# Patient Record
(demographics unavailable — no encounter records)

---

## 2024-10-09 NOTE — HISTORY OF PRESENT ILLNESS
[FreeTextEntry1] : HISTORY:  67 y/o female w/ Hx of MCTD/Scleroderma/Sjogren's (Raynaud's, GERD, telangiectasia, sclerodactyly, sicca symptoms, BARB+, SSA+, Centromere+) dx at age 42, NICM, papillary thyroid carcinoma s/p thyroidectomy with hospital admission for cardiogenic shock in 4/2021 and hyponatremia and possible infection in 6/2021 presents as follow up to rheumatology for management of her autoimmune rheumatic disease. Patient was seen by Dr. Ambrose in 3/2021. Patient underwent cardiac transplant on 11/2021 (with associated complicated hospitalization from 11/2021 - 1/2022 with AMS, CMV colitis, possible SIBO), now on Prograf.   Patient's treatment of underlying autoimmune disease (in my opinion predominantly scleroderma without skin thickening at this time) would depend on signs of active inflammation, which would likely manifest as synovitis, ILD, kidney disease which she does not have evidence of currently. Pt's NICM currently has unclear relationship with her underlying disease. MRI heart did not show any infiltrative disease or active myocarditis in 3/2021. HRCT in 3/2021 did not show signs of ILD, and PFTs done in 3/2021 we could not obtain records of. However, patient does not have major pulm symptoms after treatment of her HF. At this time, I would like to hold off on any immunosuppressant treatment unless there is any further evidence of active inflammation. Pt is now on Prograf for cardiac transplant, which may have some beneficial effect on pt's underlying autoimmune disease.   Pt's b/l hand swelling is not clearly inflammatory. However, given pt's scleroderma, I would watch carefully for signs of skin thickening, which can start with inflammation of skin, salt-and-pepper rash, and pruritus prior to skin thickening. However, I would expect skin thickening early in course of disease, not >20 years after initial diagnosis.   INTERVAL HISTORY:  Pt was last seen by me in 10/2022, late follow up due to being overwhelmed with numerous doctor visits. Patient was started on Imuran after annual cardiac cath atherosclerotic CAD suggesting mild rejection. Patient was started on Effexor for depression. Patient was started on gabapentin by neuro for LLE neuropathy symptoms. Patient follows with vascular for venous insufficiency of legs.  Pt continues to have Raynaud's, new onset of feet but generally stable because she does not usually leave the house. Pt has not had any recent lung evaluation. Pt has severe GERD on pantoprazole 40mg/day, but has not followed with GI recently.  WORKUP:  DXA (12/2023): Osteoporosis. Lowest T-score -2.5 femoral neck HRCT (3/2021): No ILD, but small to moderate pleural effusions  left lower extremity/nonweight-bearing

## 2024-10-09 NOTE — ASSESSMENT
[FreeTextEntry1] : 69 y/o female w/ Hx of MCTD/Scleroderma/Sjogren's (Raynaud's, GERD, telangiectasia, sclerodactyly, sicca symptoms, BARB+, SSA+, Centromere+) dx at age 42, NICM, papillary thyroid carcinoma s/p thyroidectomy with hospital admission for cardiogenic shock in 4/2021 and hyponatremia and possible infection in 6/2021 presents as follow up to rheumatology for management of her autoimmune rheumatic disease. Patient was seen by Dr. Ambrose in 3/2021. Patient underwent cardiac transplant on 11/2021 (with associated complicated hospitalization from 11/2021 - 1/2022 with AMS, CMV colitis, possible SIBO), now on Prograf.   Patient's treatment of underlying autoimmune disease (in my opinion predominantly scleroderma without skin thickening at this time) would depend on signs of active inflammation, which would likely manifest as synovitis, ILD, kidney disease which she does not have evidence of currently. Pt's NICM currently has unclear relationship with her underlying disease. MRI heart did not show any infiltrative disease or active myocarditis in 3/2021. HRCT in 3/2021 did not show signs of ILD, and PFTs done in 3/2021 we could not obtain records of. However, patient does not have major pulm symptoms after treatment of her HF. At this time, I would like to hold off on any immunosuppressant treatment unless there is any further evidence of active inflammation. Pt is now on Prograf for cardiac transplant, which may have some beneficial effect on pt's underlying autoimmune disease.  Patient was started on Imuran after annual cardiac cath atherosclerotic CAD suggesting mild rejection.  Patient was started on Effexor for depression. Patient was started on gabapentin by neuro for LLE neuropathy symptoms. Patient follows with vascular for venous insufficiency of legs. Pt has not had any recent lung evaluation. Pt has severe GERD on pantoprazole 40mg/day, but has not followed with GI recently.  Given pt's long standing SSc, we are mainly watching for long term effects of SSc including pHTN, ILD, GERD. Pt's cardiac status is being closely monitored by cardiology. I will try to make sure we are not missing any new onset of ILD.  Patient has borderline osteoporosis. Given pt's many other medication problems and medications, she would like to hold off on medications for osteoporosis at this time and consider after repeat DXA result in future.  - Repeat HRCT (last in 3/2021 negative for ILD) to follow up any lung involvement of scleroderma.  - Treatment of s/p cardiac transplant as per cardiology. Patient is on AZA as part of heart transplant medication which can help with the autoimmune process. - Advised follow up with GI and continue PPI for severe GERD likely secondary to scleroderma. Advised on GERD precautions, including avoiding meals prior to sleeping, sleeping with HOB >30 degrees elevated.  - Raynaud's: Stable with no new digital ulcers. Pt is currently on amlodipine for HTN. If duration and frequency of Raynaud's increases and there are frequent digital tip ulcers, would consider additional treatment. Raynaud's precautions discussed with patient Sicca: Improved, does not affect daily life  - Would avoid secretagogues if able to function with sicca symptoms with conservative management as it is only symptomatic relief and has many side effects.  - Last DXA 12/2023 with borderline osteoporosis but defers treatment. Repeat DXA in 12/2025. - Will contact pt with HRCT Results. RTC in 6 months for follow up

## 2024-10-09 NOTE — PHYSICAL EXAM
[TextEntry] : GENERAL: Appears in no acute distress HEENT: EOMI, PERRLA. No conjunctival erythema. Moist mucous membranes. No nasopharyngeal ulcers NECK: Supple, no cervical lymphadenopathy, no thyromegaly CARDIOVASCULAR: RRR PULMONARY: Clear to auscultation b/l, no wheezes, rales, or crackles ABDOMINAL: Soft, nontender, nondistended. Bowel sounds present. No organomegaly. MSK: Surgical scar, midline of chest. No active synovitis, swelling, erythema, or warmth. Raynaud's changes of b/l hands. SKIN: Occasional telangiectasia of b/l hands. Livido reticularis of b/l arms and legs. No skin thickening. No digital tip ulcers. NEURO: No focal deficits PSYCH: AAOx3. Normal affect and thought process.

## 2024-11-04 NOTE — REASON FOR VISIT
[Other: ____] : [unfilled] [FreeTextEntry1] : PCP: Dr. Mery Pettit Rheumatologist: Dr. Pavan Corbett GI: Dr. Roya Bonner

## 2024-11-04 NOTE — ASSESSMENT
[FreeTextEntry1] : 68 yo F (originally from [place]) with stage D NICM s/p HVAD as BTT on 4/27/21 (was listed status 4) now s/p OHT 11/18/21, cellept induced diarrhea/colitis, Sjogren'`s/Scleroderma since age 42 (Raynaud syndrome, GERD, telangiectasia, sclerodactyly, +Ro/centromere and BARB), ex-smoker, papillary thyroid carcinoma (T1a N0 Mx 0.8cm) s/p total thyroidectomy '19 and proteinuria who comes for routine follow up.

## 2024-11-04 NOTE — DISCUSSION/SUMMARY
[Patient] : the patient [Risks] : risks [FreeTextEntry1] : # OHT 11/18/21 - Ischemic time 194 mins - CDC crossmatch B cell positive - 1 session plasmapheresis/IvIg 11/18/21 - Repeat DSAs 1/24/23 = 0%  # Immunosuppression - Tacro 1mg BID. Tacrolimus goal to 6-8.  Repeat labs pending draw for this week. - Continue Imuran 50mg daily - Prednisone discontinued and avoiding Cellcept due to pancolitis  # Rejection Surveillance - Now starting year 4 post txp, noninvasive surveillance typically can be spread out to q6 months.   However, with her limited compliance and follow up will continue on q3 month surveillance with Allomap/Allosure (home draw), and TTEs, and q3 months labs (At [facility])  - unable to enroll in [facility] due to insurance issues.  However, patient does want to start using the gym on her own.  # CAV screening -Patient defers stress cardiac MRI at [facility].  Will try to arrange dobutamine stress echo at .  # Prophylaxis - CMV +/+ intermediate risk: was on Valcyte 450mg BID x 6 months (completed on 5/18/22) - Toxo -/- low risk.  - PJP prophylaxis: Bactrim switched to Mepron due to leukopenia. Completed 1 year (11/18/22) - Strongyloides positive, s/p Ivermectin - CAV: on crestor 20mg daily and ASA.  -Lipid panel 11/27/2023: , HDL 57, LDL 56, . -Lipid panel 2/29/2024: , HDL 54, LDL 79, . - will need repeat lipid panel with next labs.  # Lower extremity swelling L >R, lymphedema -She is having difficulties taking her Lasix every day.  We have tried maintenance therapy.  She is currently taking her Lasix 10 mg as needed. more on a weekly basis.  - Saw vascular team Dr. Comer.  Imaging performed in June 2024.  No DVT however, has deep venous insufficiency of left leg, and GSV insufficiency of right leg consistent with lymphedema.  Was fitted for UNNA boot.  Has not yet started using this.  #Intermittent palpitations -7-day Holter monitor completed in May 2024.  No arrhythmias.  # Diarrhea with hx of pancolitis/diarrhea with pancreatic duct dilation - H/o hematochezia on 12/31/22. Initial flex sigmoidoscopy biopsy was negative for CMV. Repeated sigmoidoscopy/biopsy was positive for CMV. However, finalized official report say no evidence of CMV. - Completed trial of rifaximin for possible SIBO - Readmitted with diarrhea, GI infectious work up negative. CT continues to show ?colitis. - Did not tolerated Imodium while inpatient - worsening bloating  - now following with Dr. Roya Bonner.  s/p EGD and colonoscopy.  Will have transplant team facilitate obtaining records from these procedures as well as follow-up appointment for patient - diarrhea was controlled by now back for 3 weeks.   - will check stool studies and CMV level.   # Right sided lacunar infarct with left leg numbness - Postop c/b left sided facial droop, slurred speech and left sided weakness. Symptoms improved. - Unclear if artifact on head CT.  Follow up brain MRI in 5/9/23: Cerebral volume loss with small vessel ischemic changes no acute infarct. -Has followed up with neurology.  Being referred to Dr. Paris. Repeat brain MRI shows moderate vascular changes and stable aneurysm.  EEG performed.  - ok to use gabapentin at night for neuropathy.  # Depression/Anxiety - Will have TXP team help facilitate appointment with PCP who may be able to refer to local psych team.  Was previously on Effexor however, I no longer see this on her med list.  # SJogren/Scleroderma - Follows with Dr. Corbett (Rheum).  Needs high-resolution CT for ILD monitoring.  # Hypothyroidism - Continue levothyroxine.  Managed by her PCP.   Needs endo appointment.  Can offer her a local option here in .  Patient defers for now.  #Health maintenance - needs F/U with PCP.  Will have TXP team help facilitate appointment. - Last DEXA 12/5/23: Spine: -0.9, normal, Femoral neck: -2.5, osteoporosis, Total hip: -2.2, osteopenia. I requested she follow up with her PCP for this. - Mammo/PAP: overdue.  Last mammo in 2021. - Derm: overdue for annual skin screen.  Will have TXP team help faciltiate appointment.  # Social -  has contacted pt and son and has provided arrangements for transportation services to assist her to appointments.  Her son was not present at the visit today.  She has an easier time getting to appointments in the [place] area.  Will try to make an effort to keep things local for her.  Follow-up with me in 3 months.

## 2024-11-04 NOTE — PHYSICAL EXAM
[Normal] : soft, non-tender, no masses/organomegaly, normal bowel sounds [No Rash] : no rash [Moves all extremities] : moves all extremities [Alert and Oriented] : alert and oriented [de-identified] : JVP at clavicle [de-identified] : Well-healed sternotomy scar [de-identified] : ambulates with cane [de-identified] : 1+ edema today around left ankle and foot dorsum [de-identified] : some skin tightening and shine consistent with her scleroderma diagnosis.

## 2024-11-04 NOTE — CARDIOLOGY SUMMARY
[de-identified] : 8/5/24: NSR, nonspecific IVCD with some RBBB features. 5/1/2024: NSR, nonspecific IVCD with some RBBB features. 2/7/2024: NSR, HR 89, RSR' V1 1/24/23: SR HR 95bpm with RBBB 5/31/22: ST , incomplete RBBB, LAE, QR V1/V2 [de-identified] : Holter monitor 5/1 - 5/9/2024: NSR, HR 62-1 25, average 88, RBBB pattern, 1 run of SVT max 4 beats.  PVCs and PACs less than 1% burden. [de-identified] : 8/5/2024: LVEF 75%, LVEDD 3.4 cm, IVSd 1.1 cm, GLS -16.4%, RV mildly enlarged, TAPSE decreased 1.3 cm, mod TR, PASP 30mmHg. 4/22/2024: LVEF 65 to 70%, GLS -13.1%, LVEDD 3.2 cm, IVSd 1.1 cm, normal diastolic function, normal RV, mild MR, PASP 29 mmHg. 12/20/23: LVEF 66%, GLS -14.2% (improved from prior on 10/20/2023 of -13.5%), LVEDD 3.2 cm, IVSd 1.0 cm, G3DD, mild MR, mod TR. 12/9/22: normal biV function. No valvular abnormalities. No pericardial effusion. 8/30/22: Normal bi-v function. Mild-mod TR. No evidence of pericardial effusion. No significant changes compared to prior TTE 7/8/22. 7/8/22: TTE: normal bi-v function 6/1/22 TTE: normal bi-v function 3/29/22 limited TTE. Normal RV function. No PE.  2/15/22 normal graft function, mild-mod TR. No PE.  1/18/22 normal graft function. Mod TR. No PE.   [de-identified] : 5/18/22 CT abdomen: mild left colonic wall thickening, similar to prior studies, raising concern for colitis. Diffuse main pancreatic ductal dilatation to 6 mm, increased from 12/5/2021.  [de-identified] : DEXA 12/5/23: Spine: -0.9, normal, Femoral neck: -2.5, osteoporosis, Total hip: -2.2 , osteopenia. [de-identified] : 11/13/23 LHC: coronary circulation is right dominant, mild diffuse disease LAD, LM, and RCA.  No CAD on IVUS. 1/24/23 RHC/ EMBx: 0R, C4d neg. 12/9/22: L/RHC: mild diffuse CAD, RA 2 PAP 17/9/12 PCW 5 Pasat 66% Annelise CO/CI 4.5/2.8. EMBx ISHLT Grade 1R/2 3/29/22 RHC RA 11 RV 25/4  PA 26/12/17 PCWP 9 /99 Annelise CO/CI 4.7/3.07  ACR 0R/pAMR 0 2/15/22 RA 8 RV 31/9 PA 31/8/21 PCWP 9 PA sat 69.4%Annelise CO/CI 4.8/3.17 ACR 0R pAMR 0 1/18/22: RA 2, RV 25/0, PCWP 5, PA 23/9/15,Ao Sat 99.0, PA sat 67.3, CI 3.4, ACR 1R/1A pAMR 0 1/4/22: RA 2, PA 26/10/16, SVR 1122. CI 3.56. ACR 1R/1A, pAMR 0 12/28/21: RA 5, RV 27/6, PA 27/11/18, PCWP 9  V Wave 12, PA sat 64.5 /5/85, CO/CI 3.8/3.0 ACR 1R/2 pAMR 0 12/14/21: RA 10/13/8, RV 31/1/11, PCWP 11, PA sat 76.9 ACR 0R pAMR 0 12/7/21: RA 9, RV 31/9, PA 34/7 20, PCWP 13 (v 19), Pa 66, Annelise 5.2/2.8 ACR 0R pAMR 0 11/30/21: RA 9, PA 33/16 (24), PCWP 16, Annelise CO/CI 6.0/3.7, TD CO/CI 3.5/2.2. ACR 0R/pAMR 0 11/24/21: RA 6, RV 29/9, PA 25/8/15, PCWP 9, CO/CI 4.5/2.8 (on mil 0.125); ISHLT grade 0; AMR 0 [de-identified] : Allomap/allosure 4/23/24 32/0.08 3/23/24 32/ 0.09 1/30/24 34/ <0.08 2/15/22 18/0.16 3/9/22 19/0.20 3/29/22 <0.12 4/6/22 28 4/19 12/<0.12 5/26/22 33/<0.12 6/23 35/<012 7/29 36/<0.12 8/31 34/<0.12 9/30 36/<0.12 11/04/22 34/<0.12 12/16/22 31/<0.12  DSA 11/24/21 No DSAs 11/30/21 No DSAs 12/7/21 No DSAs 12/14/21 No DSAs 12/28/21 No DSAs 1/04/22 No DSAs 1/18/22 No DSAs 2/15/22 No DSAs 3/29/22 No DSAs 10/17/22 DR1 1644  1/24/23 Class I/II 0/0

## 2024-11-04 NOTE — HISTORY OF PRESENT ILLNESS
[FreeTextEntry1] : 70 yo F (originally from [place]) with stage D NICM s/p HVAD as BTT on 4/27/21 (was listed status 4) now s/p OHT 11/18/21, cellept induced diarrhea/colitis, Sjogren'`s/Scleroderma since age 42 (Raynaud syndrome, GERD, telangiectasia, sclerodactyly, +Ro/centromere and BARB), ex-smoker, papillary thyroid carcinoma (T1a N0 Mx 0.8cm) s/p total thyroidectomy '19 and proteinuria who comes for routine follow up.   She was admitted for planned heart transplant after a suitable donor was identified. Patient underwent OHT on 11/18/21, ischemic time ~3hours. Intraop she was given 3uPRBC, 1uPLT, 1uCryo. Postop course was complicated due to persistent elevated lactate. Her cardiac index was borderline and donor CDC crossmatch report subsequently came back positive for donor B cell crossmatch. Given her borderline HD and positive crossmatch she was given 1 session of plasmapheresis and IVIG on 11/18. Her hemodynamics improved but developed slurred speech, left facial droop, and LUE weakness 11/22 prompting head CT which showed small acute lacunar infarct and EEG was negative. She also had persistent sinus bradycardia and was on terbutaline for a period of time and developed LVOT obstruction from hyperdynamic function which eventually  resolved. She had persistent altered mental status and there was a concern for PRES prompting transition from tacrolimus to cyclosporine and due to worsening mental status required intubation on 11/26. She had a persistent leukocytosis and CT abdomen raised concern for pneumatosis with air within SMV prompting laparotomy which revealed patchy hemorrhagic areas in the sigmoid/transverse colon without necrosis. She underwent sigmoidoscopy on 1/3/22 which showed colitis. 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 Frequency of BMs also improved following introduction of Rifaximin on 1/16 for possible SIBO.   She had a prolonged hospitalization from 11/18/21 through 1/19/22. Her mental status improved and prograf was reintroduced. Her most active issue was profound deconditioning for which she was transferred to [place] rehab. She was discharged from rehab on 2/3/22.   She was admitted to [facility] from 5/18/22 to 5/20/22 after she c/o nausea, vomiting and intractable diarrhea. Her labs were remarkable for leukopenia. She had extensive infectious work up including abdomen CT, Stool GI/PCR panel, CMV PCR, blood cultures and urine culture. She was found to have a UTI for which she was discharged on cefpodoxime x 5 more days. Her abd CT showed similar findings as prior studies raising concern for colitis. She was also found to have pancreatic duct dilatation. GI recommended MRI/MRCP. Her symptoms improved and she was discharged with recommendations to follow up with TxID and GI. She had completed valcyte for CMV prophylaxis. Bactrim was switched to Mepron due to leukopenia.   10/2022 she was admitted for active shingles on her left back/underarm and received IV antiviral therapy w/ improvement.   At patient's first annual LHC/RHC demonstrated mild diffuse atherosclerotic CAD and EMB ISHLT Grade 1R/2 and Imuran was started. Since then, she developed elevated liver enzymes including alk phos.  There was some pancreatic duct dilation that was identified but never got the recommended MRI follow-up.  Post transplant she struggled with intermittent medication compliance as well as consultant follow-up.  Some of this has been in the setting of likely depression.  She has reported feeling lost and having minimal social interactions staying home most of the day.  She was seen by Dr. Kong on 2/22/23 and started on Effexor with some benefit in her mood and energy.  However, she still struggles with going to all of her  appointments despite aid with transportation.  2nd annual LHC/RHC with persistent mild diffuse CAD (11/13/23) with EMB 0R, C4d neg (1/24/23).  Echocardiogram and Allomap/sure have been stable.   She was referred to cardiac rehab multiple times however was noted that her insurance is not taken at [facility] rehab center.    She has been stable with q3 monthly echo and heartcare checks.    On 5/14/24 she had noted new intermittent palpitations.  Zio patch was placed.  No change in medications were made.  She was seen by neurology with MRI revealing a stable brain aneurysm and negative EEG ( rare bitemporal intermittent slowing - nonspecific - no seizures).  I last saw her on 8/9/2024 with stable echocardiogram.  At that visit, she noted she had a recent GI workup.  She notes that she was started on an antibiotic, likely rifaximin, that was quite costly for her.  Taking this.  I also had advised her to take Lasix daily however she continues to do this as needed.  She was also seen again by neurology and started on gabapentin however she did not take as she thought this was a narcotic.  She did not have an echo scheduled for this visit.  She also declined doing a stress MRI as she no longer wants to travel to [place].  Today she comments that her heart is perfect and she is having no cardiac symptoms.  She does occasionally have a 2 to 3 lb weight fluctuation at home.  Weight usually runs 158 to 161 lbs.  On these days she often forgets to take her diuretic.  She has stable lower extremity edema.  She has not needed to take this. She specifically denies angina, SOB at rest, dizziness/LH, orthopnea and PND.  She reports new diarrhea today that has been happening for the past 3 weeks.  She did not call her office to let us know.  She says this happens almost daily.

## 2024-11-29 NOTE — ASSESSMENT
[FreeTextEntry1] : 70 yo F (originally from [place]) with stage D NICM s/p HVAD as BTT on 4/27/21 (was listed status 4) now s/p OHT 11/18/21, cellept induced diarrhea/colitis, Sjogren'`s/Scleroderma since age 42 (Raynaud syndrome, GERD, telangiectasia, sclerodactyly, +Ro/centromere and BARB), ex-smoker, papillary thyroid carcinoma (T1a N0 Mx 0.8cm) s/p total thyroidectomy '19 and proteinuria who comes for routine follow up.

## 2024-11-29 NOTE — DISCUSSION/SUMMARY
[Patient] : the patient [Risks] : risks [FreeTextEntry1] : # OHT 11/18/21 - Ischemic time 194 mins - CDC crossmatch B cell positive - 1 session plasmapheresis/IvIg 11/18/21 - Repeat DSAs 1/24/23 = 0%  # Immunosuppression - Tacro 1mg BID. Tacrolimus goal to 6-8.  Repeat labs pending draw for this week.  Las level was 5.6 on 11/5/24.  - Continue Imuran 50mg daily - Prednisone discontinued and avoiding Cellcept due to pancolitis  # Rejection Surveillance - Now starting year 4 post txp, noninvasive surveillance typically can be spread out to q6 months.   However, with her limited compliance and follow up will continue on q3 month surveillance with Allomap/Allosure (home draw), and TTEs, and q3 months labs (At [facility])  - unable to enroll in [facility] due to insurance issues.  However, patient does want to start using the gym on her own.  # CAV screening -Patient defers stress cardiac MRI at [facility].  Dobutamine stress echo at  done on 11/26/24 with no ischemia noted.  # Prophylaxis - CMV +/+ intermediate risk: was on Valcyte 450mg BID x 6 months (completed on 5/18/22) - Toxo -/- low risk.  - PJP prophylaxis: Bactrim switched to Mepron due to leukopenia. Completed 1 year (11/18/22) - Strongyloides positive, s/p Ivermectin - CAV: on crestor 20mg daily and ASA.  -Lipid panel 11/27/2023: , HDL 57, LDL 56, . -Lipid panel 2/29/2024: , HDL 54, LDL 79, . -Lipid panel 11/5/2024: , HDL 59, LDL 74, .  # Lower extremity swelling L >R, lymphedema -She is having difficulties taking her Lasix every day.  We have tried maintenance therapy.  She is currently taking her Lasix 10 mg as needed. more on a weekly basis.  - Saw vascular team Dr. Comer.  Imaging performed in June 2024.  No DVT however, has deep venous insufficiency of left leg, and GSV insufficiency of right leg consistent with lymphedema.  Was fitted for UNNA boot.  Has not yet started using this.  #Intermittent palpitations -7-day Holter monitor completed in May 2024.  No arrhythmias.  # Diarrhea with hx of colitis - H/o hematochezia on 12/31/22. Initial flex sigmoidoscopy biopsy was negative for CMV. Repeated sigmoidoscopy/biopsy was positive for CMV. However, finalized official report say no evidence of CMV. - Completed trial of rifaximin for possible SIBO - Readmitted with diarrhea, GI infectious work up negative. CT continues to show ?colitis. - Did not tolerated Imodium while inpatient - worsening bloating - Last colonoscopy 3/8/2024: Chronic inflammation on gastric biopsy, nonerosive gastritis, small hiatal hernia.  There was a 2 mm and 6 mm rectosigmoid polyp with internal/external hemorrhoids.  Sigmoid colon biopsy shows active colitis with ulceration with no definitive polyp identified. - Follows with [facility] - Dr. Roya Bonner and Dr. Rios Ya.   - diarrhea was controlled by now back since mid-October 2024. -I reached out to GI team with regards to her new symptoms of diarrhea.  They are planning repeat colonoscopy with CMV screening in early December.  Patient was unable to afford a repeat trial of Rifaximin.  # Chronic pancreatic duct dilation -MRI/MRCP 9/9/2024 with mild pancreatic duct dilation up to 4 mm in diameter grossly unchanged since 2022.  Small cysts in the pancreas measuring up to 1 cm.  # Right sided lacunar infarct with left leg numbness - Postop c/b left sided facial droop, slurred speech and left sided weakness. Symptoms improved. - Unclear if artifact on head CT.  Follow up brain MRI in 5/9/23: Cerebral volume loss with small vessel ischemic changes no acute infarct. -Has followed up with neurology.  Being referred to Dr. Paris. Repeat brain MRI shows moderate vascular changes and stable aneurysm.  EEG performed.  - ok to use gabapentin at night for neuropathy from a cardiac standpoint, however patient does not want to use this. - Has follow up appointment in Jan.  # Depression/Anxiety - previously on Effexor however, I no longer see this on her med list. -Needs to establish care with new PCP to refer her to local psych team.  # Sjogren/Scleroderma - Follows with Dr. Corbett ([place]).  Needs high-resolution CT for ILD monitoring.  # Hypothyroidism - Continue levothyroxine.  Managed by her PCP.   Needs endo appointment.  Can offer her a local option here in .  Patient defers for now.  #Health maintenance - needs F/U with PCP.  She notes her PCP has retired and she has lost contact with them.  Have office help reach out to establish a new primary doctor.  She is overdue for all of her health maintenance screening. - Last DEXA 12/5/23: Spine: -0.9, normal, Femoral neck: -2.5, osteoporosis, Total hip: -2.2, osteopenia. - Mammo/PAP: overdue.  Last mammo in 2021. - Derm: overdue for annual skin screen.  #Preoperative assessment -Plan for colonoscopy. - At present, there are no active cardiac conditions.  She has good graft function with recent dobutamine stress echo showing no evidence of ischemia/infarction. - Baseline functional status is acceptable. - The clinical benefit of the proposed procedure outweighs the associated cardiovascular risk. - Risk not attenuated with further CV testing. - Prior testing as outlined above. - Optimized from a cardiovascular perspective.  Follow-up with me in Jan with routine echo.

## 2024-11-29 NOTE — CARDIOLOGY SUMMARY
[de-identified] : 8/5/24: NSR, nonspecific IVCD with some RBBB features. 5/1/2024: NSR, nonspecific IVCD with some RBBB features. 2/7/2024: NSR, HR 89, RSR' V1 1/24/23: SR HR 95bpm with RBBB 5/31/22: ST , incomplete RBBB, LAE, QR V1/V2 [de-identified] : Holter monitor 5/1 - 5/9/2024: NSR, HR 62-1 25, average 88, RBBB pattern, 1 run of SVT max 4 beats.  PVCs and PACs less than 1% burden. [de-identified] : Dobumtamine stress echo 11/26/2024: Negative for ischemia infarction.  Hyperdynamic LV function with increased LVOT gradients with dobutamine and chordal RADHA noted. [de-identified] : 8/5/2024: LVEF 75%, LVEDD 3.4 cm, IVSd 1.1 cm, GLS -16.4%, RV mildly enlarged, TAPSE decreased 1.3 cm, mod TR, PASP 30mmHg. 4/22/2024: LVEF 65 to 70%, GLS -13.1%, LVEDD 3.2 cm, IVSd 1.1 cm, normal diastolic function, normal RV, mild MR, PASP 29 mmHg. 12/20/23: LVEF 66%, GLS -14.2% (improved from prior on 10/20/2023 of -13.5%), LVEDD 3.2 cm, IVSd 1.0 cm, G3DD, mild MR, mod TR. 12/9/22: normal biV function. No valvular abnormalities. No pericardial effusion. 8/30/22: Normal bi-v function. Mild-mod TR. No evidence of pericardial effusion. No significant changes compared to prior TTE 7/8/22. 7/8/22: TTE: normal bi-v function 6/1/22 TTE: normal bi-v function 3/29/22 limited TTE. Normal RV function. No PE.  2/15/22 normal graft function, mild-mod TR. No PE.  1/18/22 normal graft function. Mod TR. No PE.   [de-identified] : 5/18/22 CT abdomen: mild left colonic wall thickening, similar to prior studies, raising concern for colitis. Diffuse main pancreatic ductal dilatation to 6 mm, increased from 12/5/2021.  [de-identified] : DEXA 12/5/23: Spine: -0.9, normal, Femoral neck: -2.5, osteoporosis, Total hip: -2.2 , osteopenia. [de-identified] : 11/13/23 LHC: coronary circulation is right dominant, mild diffuse disease LAD, LM, and RCA.  No CAD on IVUS. 1/24/23 RHC/ EMBx: 0R, C4d neg. 12/9/22: L/RHC: mild diffuse CAD, RA 2 PAP 17/9/12 PCW 5 Pasat 66% Annelise CO/CI 4.5/2.8. EMBx ISHLT Grade 1R/2 3/29/22 RHC RA 11 RV 25/4  PA 26/12/17 PCWP 9 /99 Annelise CO/CI 4.7/3.07  ACR 0R/pAMR 0 2/15/22 RA 8 RV 31/9 PA 31/8/21 PCWP 9 PA sat 69.4%Annelise CO/CI 4.8/3.17 ACR 0R pAMR 0 1/18/22: RA 2, RV 25/0, PCWP 5, PA 23/9/15,Ao Sat 99.0, PA sat 67.3, CI 3.4, ACR 1R/1A pAMR 0 1/4/22: RA 2, PA 26/10/16, SVR 1122. CI 3.56. ACR 1R/1A, pAMR 0 12/28/21: RA 5, RV 27/6, PA 27/11/18, PCWP 9  V Wave 12, PA sat 64.5 /5/85, CO/CI 3.8/3.0 ACR 1R/2 pAMR 0 12/14/21: RA 10/13/8, RV 31/1/11, PCWP 11, PA sat 76.9 ACR 0R pAMR 0 12/7/21: RA 9, RV 31/9, PA 34/7 20, PCWP 13 (v 19), Pa 66, Annelise 5.2/2.8 ACR 0R pAMR 0 11/30/21: RA 9, PA 33/16 (24), PCWP 16, Annelise CO/CI 6.0/3.7, TD CO/CI 3.5/2.2. ACR 0R/pAMR 0 11/24/21: RA 6, RV 29/9, PA 25/8/15, PCWP 9, CO/CI 4.5/2.8 (on mil 0.125); ISHLT grade 0; AMR 0 [de-identified] : Allomap/allosure 4/23/24 32/0.08 3/23/24 32/ 0.09 1/30/24 34/ <0.08 2/15/22 18/0.16 3/9/22 19/0.20 3/29/22 <0.12 4/6/22 28 4/19 12/<0.12 5/26/22 33/<0.12 6/23 35/<012 7/29 36/<0.12 8/31 34/<0.12 9/30 36/<0.12 11/04/22 34/<0.12 12/16/22 31/<0.12  DSA 11/24/21 No DSAs 11/30/21 No DSAs 12/7/21 No DSAs 12/14/21 No DSAs 12/28/21 No DSAs 1/04/22 No DSAs 1/18/22 No DSAs 2/15/22 No DSAs 3/29/22 No DSAs 10/17/22 DR1 1644  1/24/23 Class I/II 0/0

## 2024-11-29 NOTE — PHYSICAL EXAM
[Normal] : soft, non-tender, no masses/organomegaly, normal bowel sounds [No Rash] : no rash [Moves all extremities] : moves all extremities [Alert and Oriented] : alert and oriented [de-identified] : JVP at clavicle [de-identified] : Well-healed sternotomy scar [de-identified] : ambulates with cane [de-identified] : 1+ edema today around left ankle and foot dorsum [de-identified] : some skin tightening and shine consistent with her scleroderma diagnosis.

## 2024-11-29 NOTE — REASON FOR VISIT
MEDICARE WELLNESS VISIT - SUBSEQUENT      Patient: Dave Roman Date of Service: 2022   : 1952 MRN: [id number]     SUBJECTIVE:     Chief Complaint   Patient presents with   â¢ Medicare Wellness Visit   â¢ Imm/Inj     Dtap  Shingles  Covid #4       HISTORY OF PRESENT ILLNESS:  Dave Roman is a 79year old male who presents today for his Annual Medicare Wellness Visit. Still working full time at [employer]. Thinking will go to [facility] next year for a few months. Has 2 grown boys in the area. Wife still working as a teacher. History of right knee pain  Says symptoms are now better since he has been walking a lot for work and exercising. Â   History of MI in 2015  seeing Dr. Julian Lennon. Did not tolerate atorvastatin and rosuvastatin, soÂ he is onÂ livalo 2mg daily BID. Also getting repatha injection every 2wks. Also on aspirin 81mg daily. Â   Still onÂ carvedilolol 3.125mg 2x/day  Has 2 small bruises on his right and left wrist.  Â   Pulmonary Fibrosis, COPD  Quit smoking in . He smoked from 2021 until Dec 2021. (says he got upset after a memorial for his brother), due to this bought some cigarettes. Since then no longer smoking cigarettes. However, admits to smoking weed 2x/day  on Symbicort 160-4.5mgÂ BID  He was given prescription for spiriva but says he didn't tolerate it, says it made him uncomfortable. Was given script for trelegey ellipta but says he felt a burning in his chest  also used Ventolin as needed,Â using aboutÂ 3-4x/day  seen by pulm in . had spirometry. also had CT scan for lung nodules that were scar tissue. Mood Disorder  Says he is smoking weed and it helps his mood. Not interested in medications. Says he wants to work on quitting weed. Â   Thyroid Nodule  had US showing nodule that was small and likely benign.   they were seen by cathy,FARTUN Coulter (in [place]), then they saw Dr. Kathy Angulo he had a repeat US which was stable, but that was a while ago  Says he had been recommended biopsy and doesn't want to do that. Had been given an order for US thyroid, but hasn't done. Â   Vit D def  Not taking vit D supplement. Â   Health Maintenance:  Colon:Â colonoscopy 1/23/19 with Dr. Carolina Olivarez, repeat in 5 years due to polyps  Prostate: no urinary symptoms, had PSA 7/16/21  AAA screen: 3/23/22: negative    --    Patient Answered Medicare HRA Screening Questions  1.) Do you have an Advance directive, living will, or power of  for health care document that contains your wishes for end of life care? No    2.) Would you like additional information on advance directives? Yes    3.) During the past 4 weeks, how would you rate your health? Good    4.) During the past 4 weeks, what was the hardest physical activity you could do for at least 2 minutes? Moderate    5.) Do you do moderate to strenuous exercise (brisk walk) for about 20 minutes for 3 or more days per week? No, but I am active with housework or yard work (vacuuming, raking, etc.)    6.) How many servings of the following would you typically eat in a day? Fruits and Vegetables (1 serving = 1 piece of fruit, 1/2 cup fruits or vegetables) 2-3 per day  High Fiber / Whole Grain Foods (1 serving = 1 cup cold cereal, 1/2 cup cooked cereal, 1 slice bread) 2-3 per day  Fried or [employer] (1 serving = 1 Minnette Ingris, [place] Mallie Tracee, chips, doughnut, fried chicken/fish) 1 per day  Sugar Sweetened Beverages (1 serving = 1 can or 12 oz cup of soda or juice) None    7.) Have you had a fall two or more times in the past year? No Value exists for the : AMB#599    8.) During the past 4 weeks, has your physical and emotional health limited your social activities with family, friends, neighbors, or other groups? Not at all    9.) Do you feel safe at home? Yes    10.) How often do you have trouble taking medicines the way you have been told to take them?  I always take my prescribed medications    11.) Over the past 4 weeks how often have you experienced the following? Bladder Control problems - (urine leaking)Never  Bowel control problems - Never  Teeth or Denture Problems - Never  Bodily pain - Never  Tiredness or Fatigue - Never  Feeling stressed or overwhelmed - Never  Anger or frustration - Never  Problems with your hearing - Never  Problems using the telephone - Never  Problems with your balance - No Value exists for the : AMB#627  Driven/Ridden in a car without wearing your seatbelt - Never  Sexual Problems - Never    12.) Do you need help with any of the following activities (bathing, grooming, feeding, toileting, getting out of bed/chairs)? None of these apply to me    13.) Do you need help with any of the following activities (going to places outside of walking distance, shopping, housework/laundry, preparing meals, handling own money)? None of these apply to me    14.) During the past 4 weeks, was someone available to help if you needed and wanted help? Yes, as much as I wanted    15.) How confident are you that you can control and manage most of your health problems? Somewhat confident      Cognitive Assessment: no evidence of cognitive dysfunction by direct observation    Body mass index is 25.47 kg/mÂ². BMI ASSESSMENT/PLAN:  Patient is overweight.     Journal food intake daily and 30-60 minutes of physical activity a day        Screenings  ADLs  ADL Before Admission: Independent  ADL Needs Assist: No  ADL Score: 12    Short of Breath or Fatigue with ADL's: No  Recent Decline in ADL's: No    Are you deaf or do you have serious difficulty  hearing? : No  Are you blind or do you have serious difficulty seeing, even when wearing glasses?: No  Sensory Support Devices: Eyeglasses, Dentures    iADLs       Home Safety: lives with wife, safe at home    Depression PHQ2/9:  Little interest or pleasure in activity?: Not at all  Feeling down, depressed or hopeless?: Not at all  Initial depression screening score[de-identified] 0  PHQ2 Interpretation: No further screening needed         Depression assessment/plan: Depression screening is negative no further plan needed. Cognitive/Functional Status:  Are you deaf or do you have serious difficulty  hearing? : No  Are you blind or do you have serious difficulty seeing, even when wearing glasses?: No  Are you blind or do you have serious difficulty seeing, even when wearing glasses?: No  Because of a physical, mental, or emotional condition, do you have serious difficulty concentrating, remembering or making decisions? : No  Do you have serious difficulty walking or climbing stairs?: No  Do you have difficulty dressing or bathing?: No  Because of a physical, mental, or emotional condition, do you have difficulty doing errands alone?: No  Patient was given repeat back words from version[de-identified] 4 - 100 [facility]  Patient able to fill in the clock face with 10 minutes past 11 o'clock?: No, clock is not correct (took two times)  Cognitive Assessment: no evidence of cognitive dysfunction by direct observation    STEADI-Fall Risk       Hearing Impairment: Are you deaf or do you have serious difficulty  hearing? : No  Vision/Hearing Screening:    Hearing Screening    125Hz 250Hz 500Hz 1000Hz 2000Hz 3000Hz 4000Hz 6000Hz 8000Hz   Right ear:            Left ear:            Comments: Able to hear finger rub bilaterally     Visual Acuity Screening    Right eye Left eye Both eyes   Without correction:      With correction: 20/25 20/25 20/20        Advanced care planning: Discussed with patient. Patient understand need and will complete forms. Forms provided    CARE TEAM:  Patient Care Team:  Jeanette Rhodes DO as PCP - General  Gisel Webb MD as Cardiologist (Cardiovascular Disease)    REVIEW OF SYSTEMS:  All systems reviewed and negative except noted in HPI.     MEDICATIONS:  Current Outpatient Medications   Medication Sig   â¢ ibuprofen (MOTRIN) 800 MG tablet    â¢ albuterol 108 (90 Base) MCG/ACT inhaler Inhale 2 puffs into the lungs every 4 hours as needed for Shortness of Breath or Wheezing. â¢ budesonide-formoterol (Symbicort) 160-4.5 MCG/ACT inhaler INHALE 2 PUFFS BY MOUTH TWICE DAILY. RINSE MOUTH AFTER USE   â¢ carvedilol (COREG) 3.125 MG tablet Take 1 tablet by mouth in the morning and 1 tablet in the evening. Take with meals. â¢ Repatha 140 MG/ML injection    â¢ Livalo 2 MG Tab TAKE 1 TABLET BY MOUTH DAILY   â¢ aspirin (ECOTRIN) 81 MG EC tablet Take 81 mg by mouth. No current facility-administered medications for this visit. Librada Krabbe   Current Outpatient Medications   Medication Sig Dispense Refill   â¢ ibuprofen (MOTRIN) 800 MG tablet      â¢ albuterol 108 (90 Base) MCG/ACT inhaler Inhale 2 puffs into the lungs every 4 hours as needed for Shortness of Breath or Wheezing. 25.5 g 1   â¢ budesonide-formoterol (Symbicort) 160-4.5 MCG/ACT inhaler INHALE 2 PUFFS BY MOUTH TWICE DAILY. RINSE MOUTH AFTER USE 30.6 g 3   â¢ carvedilol (COREG) 3.125 MG tablet Take 1 tablet by mouth in the morning and 1 tablet in the evening. Take with meals. 180 tablet 3   â¢ Repatha 140 MG/ML injection      â¢ Livalo 2 MG Tab TAKE 1 TABLET BY MOUTH DAILY 30 tablet 0   â¢ aspirin (ECOTRIN) 81 MG EC tablet Take 81 mg by mouth. No current facility-administered medications for this visit. [address] [place] [address] [id number] - [address] [facility] AT [address] [place] [address] Drive [address]  Phone: [phone] Fax: [phone]      Patient Care Team:  Abdirashid Proctor DO as PCP - Sabine Riojas MD as Cardiologist (Cardiovascular Disease)    ALLERGIES:  ALLERGIES:  No Known Allergies    PROBLEM LIST:    Patient Active Problem List   Diagnosis   â¢ Arteriosclerosis of coronary artery   â¢ COPD (chronic obstructive pulmonary disease) (CMS/[facility])   â¢ Fear of flying   â¢ Hyperlipidemia   â¢ Need for influenza vaccination   â¢ Need for vaccination   â¢ Prostate cancer screening   â¢ Screen for colon cancer   â¢ Thyroid nodule   â¢ Lung nodule   â¢ History of myocardial infarction in adulthood   â¢ History of Lily-Andrews syndrome   â¢ Prediabetes   â¢ Coronary artery disease involving native coronary artery of native heart   â¢ Vitamin D deficiency   â¢ Health maintenance examination   â¢ Long term (current) use of aspirin    â¢ Interstitial pulmonary fibrosis (CMS/HCC)       PAST MEDICAL HISTORY:  Past Medical History:   Diagnosis Date   â¢ Bilateral inguinal hernia 02/10/2012   â¢ COVID-19 virus infection 2022   â¢ Lily-Andrews tear 2015    Dr. Bradley Chávez   â¢ Numbness and tingling of both feet 2020       PAST SURGICAL HISTORY:  Past Surgical History:   Procedure Laterality Date   â¢ Coronary stent placement  2015    anterior MI with PCI stent placement to mid LAD   â¢ Esophagogastroduodenoscopy  2015    showing active bleeding from [facility] Tear   â¢ Hernia repair Bilateral     inginual hernia repair       FAMILY HISTORY:  Family History   Problem Relation Age of Onset   â¢ Heart disease Mother    â¢ Heart disease Father    â¢ COPD Father    â¢ Patient is unaware of any medical problems Sister    â¢ Alcohol Abuse Brother    â¢ Cancer, Kidney Brother    â¢ Myocardial Infarction Brother          at age 59   â¢ Patient is unaware of any medical problems Brother        SOCIAL HISTORY:  Social History     Tobacco Use   â¢ Smoking status: Former Smoker     Packs/day: 2.50     Years: 50.00     Pack years: 125.00     Types: Cigarettes     Quit date: 2015     Years since quittin.7   â¢ Smokeless tobacco: Never Used   â¢ Tobacco comment: 2-3packs for about 50 years   Vaping Use   â¢ Vaping Use: never used   Substance Use Topics   â¢ Alcohol use: Yes     Comment: social   â¢ Drug use: Yes     Frequency: 7.0 times per week     Types: Marijuana     Comment: everyday       Patient's medications, allergies, past medical, surgical, social and family histories were reviewed and updated as "appropriate. OBJECTIVE:     Visit Vitals  /78 (BP Location: LUE - Left upper extremity, Patient Position: Sitting, Cuff Size: Regular)   Pulse 63   Temp 98.2 Â°F (36.8 Â°C) (Oral)   Ht 5' 11"" (1.803 m)   Wt 82.9 kg (182 lb 10.4 oz)   SpO2 96%   BMI 25.47 kg/mÂ²     CONSTITUTIONAL:  healthy-appearing, well nourished, NAD, ambulating normally  HEAD: normocephalic, atraumatic  EYES:  No discharge or pallor. Non-injected. EOMI. Sclera non-icteric. ENMT: Normal dentition. No mouth or lip ulcers. MMM. external canals clear, TMs clear, no lesions on external ear. NECK: supple. FROM. No masses. LUNGS: no dyspnea. No wheezing, rales or ronchi. Breath sounds normal with good air movement. CARDIO: RRR, normal S1/S2, no murmurs, rubs, or gallops. ABDOMEN: bowel sounds normal.   Soft, non-tender to palpation. No guarding or rebound tenderness. No masses. No hepatomegaly. No spleenomegaly. MUSCULOSKELETAL: strength and tone normal, no bony abnormalities, normal movement of all extremities. No malalignment or bony abnormalities. No edema. Full ROM. BACK: Back symmetric, normal curvature. SKIN: Skin color, texture, turgor normal. No rashes or suspicious lesions. No abnormal ecchymosis. No jaundice. Normal nails. NEUROLOGIC: Gait normal. Muscle strength 5/5 throughout. Sensation grossly intact. DIAGNOSTIC STUDIES:     LAB RESULTS:  Reviewed/ordered    ASSESSMENT AND PLAN:   This is a 79year old year-old male who presents with:  1. Health maintenance examination    2. Interstitial pulmonary fibrosis (CMS/HCC)    3. Arteriosclerosis of coronary artery    4. Chronic bronchitis, unspecified chronic bronchitis type (CMS/HCC)    5. Mixed hyperlipidemia    6. Prostate cancer screening    7. History of myocardial infarction in adulthood    8. Vitamin D deficiency    9. Coronary artery disease involving native coronary artery of native heart without angina pectoris    10. Prediabetes    11.  " Thyroid nodule    12. Long term (current) use of aspirin       Orders Placed This Encounter   â¢ US THYROID   â¢ CBC with Automated Differential   â¢ Comprehensive Metabolic Panel   â¢ Glycohemoglobin   â¢ Lipid Panel With Reflex   â¢ Vitamin D -25 Hydroxy   â¢ Thyroid Stimulating Hormone Reflex   â¢ PSA   â¢ Urinalysis & Reflex Microscopy With Culture If Indicated   â¢ Microalbumin Urine Random   â¢ DISCONTD: amoxicillin (AMOXIL) 500 MG capsule   â¢ DISCONTD: clindamycin (CLEOCIN) 300 MG capsule   â¢ ibuprofen (MOTRIN) 800 MG tablet   â¢ albuterol 108 (90 Base) MCG/ACT inhaler   â¢ budesonide-formoterol (Symbicort) 160-4.5 MCG/ACT inhaler   â¢ carvedilol (COREG) 3.125 MG tablet       CAD s/p anterior MI with PCI stent placement to mid LAD on 11/16/15  -continue f/u with Dr. Apoilnar Dumont  -ordered labs as above  -continue medsÂ per cardiology (carvedilol, livalo, rapatha, and aspirin)  Â   Pulmonary Fibrosis, COPD  -continue symbicort 2 puffs BID  -continue ventalin inhaler as needed for SOB  -discussed adding additional controller medications, but pt declines. If worsening SOB, recommend pulm referral  Â   Thyroid Nodules  -order for US thyroid as previously orderedÂ for further evaluation  Â   Vit D def  -ordered Vit D levels  -further dose adjustments pending results of labs  Â   History of Knee Pain  -consider ortho if symptoms  Â   Health Maintenance:  -discussed healthy diet and exercise  (encouraged www. [email or web address]. govÂ for further info regarding healthy diet and encouraged 10,000steps/day or 150min exercise/week)  -ordered labs as above  -vaccines:Â recommend shingles x2 and Tdap vaccines. pt declines today but agrees to do at next OV.   -colonoscopy: UTD 1/19 with Dr. Huseyin Mcgregor, repeat in 5 years due to polyps  -discussed prostate cancer screening: ordered PSA  -lung cancer screening for smokers; CT lung cancer screening UTD  -AAA screen:Â 3/23/22: negative.  -advanced directives: completed with patient  Â   Â   Testing should be completed prior to next visit. New prescriptions / refills sent to the pharmacy. Patient was advised to call if they experience any new or worsening symptoms. Screening schedule/checklist for next 5-10 years:  Health Maintenance Due   Topic Date Due   â¢ DTaP/Tdap/Td Vaccine (1 - Tdap) Never done   â¢ Shingles Vaccine (1 of 2) Never done   â¢ COVID-19 Vaccine (4 - Booster for [place] series) 12/23/2021   â¢ Traditional Medicare- Medicare Wellness Visit  07/16/2022        Needed Screening/Treatment:   Cardiovascular screening - Lipids , Cardiovascular screening - Abdominal aortic US, Diabetes screening  and Colorectal cancer screening      Needed follow up:  None    FOLLOW UP: No follow-ups on file. Instructions provided as documented in the AVS.  The patient indicated understanding of the diagnosis and agreed with the plan of care. A written education, counseling, referral, and plan for obtaining appropriate screening services has been given to patient. See patient instructions. [Other: ____] : [unfilled] [FreeTextEntry1] : PCP: Dr. Mery Pettit Rheumatologist: Dr. Pavan Corbett GI: Dr. Roya Bonner

## 2024-11-29 NOTE — HISTORY OF PRESENT ILLNESS
[FreeTextEntry1] : 70 yo F (originally from [place]) with stage D NICM s/p HVAD as BTT on 4/27/21 (was listed status 4) now s/p OHT 11/18/21, cellept induced diarrhea/colitis, Sjogren'`s/Scleroderma since age 42 (Raynaud syndrome, GERD, telangiectasia, sclerodactyly, +Ro/centromere and BARB), ex-smoker, papillary thyroid carcinoma (T1a N0 Mx 0.8cm) s/p total thyroidectomy '19 and proteinuria who comes for routine follow up.   She was admitted for planned heart transplant after a suitable donor was identified. Patient underwent OHT on 11/18/21, ischemic time ~3hours. Intraop she was given 3uPRBC, 1uPLT, 1uCryo. Postop course was complicated due to persistent elevated lactate. Her cardiac index was borderline and donor CDC crossmatch report subsequently came back positive for donor B cell crossmatch. Given her borderline HD and positive crossmatch she was given 1 session of plasmapheresis and IVIG on 11/18. Her hemodynamics improved but developed slurred speech, left facial droop, and LUE weakness 11/22 prompting head CT which showed small acute lacunar infarct and EEG was negative. She also had persistent sinus bradycardia and was on terbutaline for a period of time and developed LVOT obstruction from hyperdynamic function which eventually  resolved. She had persistent altered mental status and there was a concern for PRES prompting transition from tacrolimus to cyclosporine and due to worsening mental status required intubation on 11/26. She had a persistent leukocytosis and CT abdomen raised concern for pneumatosis with air within SMV prompting laparotomy which revealed patchy hemorrhagic areas in the sigmoid/transverse colon without necrosis. She underwent sigmoidoscopy on 1/3/22 which showed colitis. 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 Frequency of BMs also improved following introduction of Rifaximin on 1/16 for possible SIBO.   She had a prolonged hospitalization from 11/18/21 through 1/19/22. Her mental status improved and prograf was reintroduced. Her most active issue was profound deconditioning for which she was transferred to [facility] rehab. She was discharged from rehab on 2/3/22.   She was admitted to [facility] from 5/18/22 to 5/20/22 after she c/o nausea, vomiting and intractable diarrhea. Her labs were remarkable for leukopenia. She had extensive infectious work up including abdomen CT, Stool GI/PCR panel, CMV PCR, blood cultures and urine culture. She was found to have a UTI for which she was discharged on cefpodoxime x 5 more days. Her abd CT showed similar findings as prior studies raising concern for colitis. She was also found to have pancreatic duct dilatation. GI recommended MRI/MRCP. Her symptoms improved and she was discharged with recommendations to follow up with TxID and GI. She had completed valcyte for CMV prophylaxis. Bactrim was switched to Mepron due to leukopenia.   10/2022 she was admitted for active shingles on her left back/underarm and received IV antiviral therapy w/ improvement.   At patient's first annual LHC/RHC demonstrated mild diffuse atherosclerotic CAD and EMB ISHLT Grade 1R/2 and Imuran was started. Since then, she developed elevated liver enzymes including alk phos.  There was some pancreatic duct dilation that was identified but never got the recommended MRI follow-up.  Post transplant she struggled with intermittent medication compliance as well as consultant follow-up.  Some of this has been in the setting of likely depression.  She has reported feeling lost and having minimal social interactions staying home most of the day.  She was seen by Dr. Kong on 2/22/23 and started on Effexor with some benefit in her mood and energy.  However, she still struggles with going to all of her  appointments despite aid with transportation.  2nd annual LHC/RHC with persistent mild diffuse CAD (11/13/23) with EMB 0R, C4d neg (1/24/23).  Echocardiogram and Allomap/sure have been stable.   She was referred to cardiac rehab multiple times however was noted that her insurance is not taken at [facility] rehab center.    She has been stable with q3 monthly echo and heartcare checks.    On 5/14/24 she had noted new intermittent palpitations.  Zio patch was placed.  No change in medications were made.  She was seen by neurology with MRI revealing a stable brain aneurysm and negative EEG (rare bitemporal intermittent slowing - nonspecific - no seizures).  I last saw her on 8/9/2024 with stable echocardiogram.  At that visit, she noted she had a recent GI workup.  She notes that she was started on an antibiotic, likely rifaximin, that was quite costly for her.  She is not taking this.  I also had advised her to take Lasix daily however she continues to do this as needed.  She was also seen again by neurology and started on gabapentin however she did not take as she thought this was a narcotic.  I last saw her on 11/4/2024.  Patient reported ongoing diarrhea x 3 weeks which she did not tell the transplant team about.  I had reached out to her GI team at [facility] who had plans for repeating stool studies checks possibly repeating colonoscopy for CMV screening.  In addition, patient deferred going to [place] for stress MRI.  Dobutamine stress echocardiogram was arranged for CAV surveillance.  She is here today to review those results.  Today she overall feels well.  She still has intermittent diarrhea mixed with constipation.  She notes almost nightly pain in her abdomen that starts around 6 PM.  She took Lasix 1 time this week for chronic left lower extremity edema.  She has not needed to take this. She specifically denies angina, SOB at rest, dizziness/LH, orthopnea and PND.  She notes she has a colonoscopy scheduled for early December.  At baseline, she ambulates with a cane.

## 2025-01-30 NOTE — HISTORY OF PRESENT ILLNESS
[FreeTextEntry1] : 68 yo F (originally from [place]) with stage D NICM s/p HVAD as BTT on 4/27/21 (was listed status 4) now s/p OHT 11/18/21, cellept induced diarrhea/colitis, Sjogren'`s/Scleroderma since age 42 (Raynaud syndrome, GERD, telangiectasia, sclerodactyly, +Ro/centromere and BARB), ex-smoker, papillary thyroid carcinoma (T1a N0 Mx 0.8cm) s/p total thyroidectomy '19 and proteinuria who comes for routine follow up.   She was admitted for planned heart transplant after a suitable donor was identified. Patient underwent OHT on 11/18/21, ischemic time ~3hours. Intraop she was given 3uPRBC, 1uPLT, 1uCryo. Postop course was complicated due to persistent elevated lactate. Her cardiac index was borderline and donor CDC crossmatch report subsequently came back positive for donor B cell crossmatch. Given her borderline HD and positive crossmatch she was given 1 session of plasmapheresis and IVIG on 11/18. Her hemodynamics improved but developed slurred speech, left facial droop, and LUE weakness 11/22 prompting head CT which showed small acute lacunar infarct and EEG was negative. She also had persistent sinus bradycardia and was on terbutaline for a period of time and developed LVOT obstruction from hyperdynamic function which eventually  resolved. She had persistent altered mental status and there was a concern for PRES prompting transition from tacrolimus to cyclosporine and due to worsening mental status required intubation on 11/26. She had a persistent leukocytosis and CT abdomen raised concern for pneumatosis with air within SMV prompting laparotomy which revealed patchy hemorrhagic areas in the sigmoid/transverse colon without necrosis. She underwent sigmoidoscopy on 1/3/22 which showed colitis. 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 Frequency of BMs also improved following introduction of Rifaximin on 1/16 for possible SIBO.   She had a prolonged hospitalization from 11/18/21 through 1/19/22. Her mental status improved and prograf was reintroduced. Her most active issue was profound deconditioning for which she was transferred to [facility]. She was discharged from rehab on 2/3/22.   She was admitted to [facility] from 5/18/22 to 5/20/22 after she c/o nausea, vomiting and intractable diarrhea. Her labs were remarkable for leukopenia. She had extensive infectious work up including abdomen CT, Stool GI/PCR panel, CMV PCR, blood cultures and urine culture. She was found to have a UTI for which she was discharged on cefpodoxime x 5 more days. Her abd CT showed similar findings as prior studies raising concern for colitis. She was also found to have pancreatic duct dilatation. GI recommended MRI/MRCP. Her symptoms improved and she was discharged with recommendations to follow up with TxID and GI. She had completed valcyte for CMV prophylaxis. Bactrim was switched to Mepron due to leukopenia.   10/2022 she was admitted for active shingles on her left back/underarm and received IV antiviral therapy w/ improvement.   At patient's first annual LHC/RHC demonstrated mild diffuse atherosclerotic CAD and EMB ISHLT Grade 1R/2 and Imuran was started. Since then, she developed elevated liver enzymes including alk phos.  There was some pancreatic duct dilation that was identified but never got the recommended MRI follow-up.  Post transplant she struggled with intermittent medication compliance as well as consultant follow-up.  Some of this has been in the setting of likely depression.  She has reported feeling lost and having minimal social interactions staying home most of the day.  She was seen by Dr. Kong on 2/22/23 and started on Effexor with some benefit in her mood and energy.  However, she still struggles with going to all of her  appointments despite aid with transportation.  2nd annual LHC/RHC with persistent mild diffuse CAD (11/13/23) with EMB 0R, C4d neg (1/24/23).  Echocardiogram and Allomap/sure have been stable.   She was referred to cardiac rehab multiple times however was noted that her insurance is not taken at [facility].    She has been stable with q3 monthly echo and heartcare checks.    On 5/14/24 she had noted new intermittent palpitations.  Zio patch was placed.  No change in medications were made.  She was seen by neurology with MRI revealing a stable brain aneurysm and negative EEG (rare bitemporal intermittent slowing - nonspecific - no seizures).  I last saw her on 8/9/2024 with stable echocardiogram.  At that visit, she noted she had a recent GI workup.  She notes that she was started on an antibiotic, likely rifaximin, that was quite costly for her.  She is not taking this.  I also had advised her to take Lasix daily however she continues to do this as needed.  She was also seen again by neurology and started on gabapentin however she did not take as she thought this was a narcotic.  I last saw her on 11/29/2024.  At that time, patient reported ongoing diarrhea x 3 weeks which she did not tell the transplant team about.  I had reached out to [place] [facility] [place] had repeat her EDG/colo (results not available).  CMV level was unremarkable.  In addition, patient deferred going to [place] for stress MRI.  Dobutamine stress echocardiogram was arranged for CAV surveillance with no suggestion of ischemia or infarction.   Today she overall feels well.  She still has intermittent diarrhea mixed with constipation.  she notes that all of her medical problems now are "non cardiac".  She has upcoming appointments with neurology and GI teams.  She has not needed to take lasix for her conic left lower extremity edema.  She specifically denies angina, SOB at rest, dizziness/LH, orthopnea and PND.  TTE surveillance screening done today.    At baseline, she ambulates with a cane.

## 2025-01-30 NOTE — CARDIOLOGY SUMMARY
[de-identified] : 1/29/15: NSr, HR 82, low voltage precordial leads, nonspecific IVCD with some RBBB features.  8/5/24: NSR, nonspecific IVCD with some RBBB features. 5/1/2024: NSR, nonspecific IVCD with some RBBB features. 2/7/2024: NSR, HR 89, RSR' V1 1/24/23: SR HR 95bpm with RBBB 5/31/22: ST , incomplete RBBB, LAE, QR V1/V2 [de-identified] : Holter monitor 5/1 - 5/9/2024: NSR, HR 62-1 25, average 88, RBBB pattern, 1 run of SVT max 4 beats.  PVCs and PACs less than 1% burden. [de-identified] : Dobumtamine stress echo 11/26/2024: Negative for ischemia infarction.  Hyperdynamic LV function with increased LVOT gradients with dobutamine and chordal RADHA noted. [de-identified] : 1/29/25: LVEF > 75%, LVEDD 3.0cm, IVSd 1.1cm, GLS unable to do, normal RV, TAPSE 1.9cm, trace MR, mod TR, PAPS 28mmHg.  8/5/2024: LVEF 75%, LVEDD 3.4 cm, IVSd 1.1 cm, GLS -16.4%, RV mildly enlarged, TAPSE decreased 1.3 cm, mod TR, PASP 30mmHg. 4/22/2024: LVEF 65 to 70%, GLS -13.1%, LVEDD 3.2 cm, IVSd 1.1 cm, normal diastolic function, normal RV, mild MR, PASP 29 mmHg. 12/20/23: LVEF 66%, GLS -14.2% (improved from prior on 10/20/2023 of -13.5%), LVEDD 3.2 cm, IVSd 1.0 cm, G3DD, mild MR, mod TR. 12/9/22: normal biV function. No valvular abnormalities. No pericardial effusion. 8/30/22: Normal bi-v function. Mild-mod TR. No evidence of pericardial effusion. No significant changes compared to prior TTE 7/8/22. 7/8/22: TTE: normal bi-v function 6/1/22 TTE: normal bi-v function 3/29/22 limited TTE. Normal RV function. No PE.  2/15/22 normal graft function, mild-mod TR. No PE.  1/18/22 normal graft function. Mod TR. No PE.   [de-identified] : 5/18/22 CT abdomen: mild left colonic wall thickening, similar to prior studies, raising concern for colitis. Diffuse main pancreatic ductal dilatation to 6 mm, increased from 12/5/2021.  [de-identified] : DEXA 12/5/23: Spine: -0.9, normal, Femoral neck: -2.5, osteoporosis, Total hip: -2.2 , osteopenia. [de-identified] : 11/13/23 LHC: coronary circulation is right dominant, mild diffuse disease LAD, LM, and RCA.  No CAD on IVUS. 1/24/23 RHC/ EMBx: 0R, C4d neg. 12/9/22: L/RHC: mild diffuse CAD, RA 2 PAP 17/9/12 PCW 5 Pasat 66% Annelise CO/CI 4.5/2.8. EMBx ISHLT Grade 1R/2 3/29/22 RHC RA 11 RV 25/4  PA 26/12/17 PCWP 9 /99 Annelise CO/CI 4.7/3.07  ACR 0R/pAMR 0 2/15/22 RA 8 RV 31/9 PA 31/8/21 PCWP 9 PA sat 69.4%Annelise CO/CI 4.8/3.17 ACR 0R pAMR 0 1/18/22: RA 2, RV 25/0, PCWP 5, PA 23/9/15,Ao Sat 99.0, PA sat 67.3, CI 3.4, ACR 1R/1A pAMR 0 1/4/22: RA 2, PA 26/10/16, SVR 1122. CI 3.56. ACR 1R/1A, pAMR 0 12/28/21: RA 5, RV 27/6, PA 27/11/18, PCWP 9  V Wave 12, PA sat 64.5 /5/85, CO/CI 3.8/3.0 ACR 1R/2 pAMR 0 12/14/21: RA 10/13/8, RV 31/1/11, PCWP 11, PA sat 76.9 ACR 0R pAMR 0 12/7/21: RA 9, RV 31/9, PA 34/7 20, PCWP 13 (v 19), Pa 66, Annelise 5.2/2.8 ACR 0R pAMR 0 11/30/21: RA 9, PA 33/16 (24), PCWP 16, Annelise CO/CI 6.0/3.7, TD CO/CI 3.5/2.2. ACR 0R/pAMR 0 11/24/21: RA 6, RV 29/9, PA 25/8/15, PCWP 9, CO/CI 4.5/2.8 (on mil 0.125); ISHLT grade 0; AMR 0 [de-identified] : Allomap/allosure 8/7/24: 33/0.08 4/23/24 32/0.08 3/23/24 32/ 0.09 1/30/24 34/ <0.08 2/15/22 18/0.16 3/9/22 19/0.20 3/29/22 <0.12 4/6/22 28 4/19 12/<0.12 5/26/22 33/<0.12 6/23 35/<012 7/29 36/<0.12 8/31 34/<0.12 9/30 36/<0.12 11/04/22 34/<0.12 12/16/22 31/<0.12  DSA 11/24/21 No DSAs 11/30/21 No DSAs 12/7/21 No DSAs 12/14/21 No DSAs 12/28/21 No DSAs 1/04/22 No DSAs 1/18/22 No DSAs 2/15/22 No DSAs 3/29/22 No DSAs 10/17/22 DR1 1644  1/24/23 Class I/II 0/0

## 2025-01-30 NOTE — PHYSICAL EXAM
[Normal] : soft, non-tender, no masses/organomegaly, normal bowel sounds [No Rash] : no rash [Moves all extremities] : moves all extremities [Alert and Oriented] : alert and oriented [de-identified] : JVP at clavicle [de-identified] : Well-healed sternotomy scar [de-identified] : ambulates with cane [de-identified] : trace nonpitting edema around left ankle and foot dorsum [de-identified] : some skin tightening and shine consistent with her scleroderma diagnosis.

## 2025-01-30 NOTE — DISCUSSION/SUMMARY
[Patient] : the patient [Risks] : risks [FreeTextEntry1] : # OHT 11/18/21 - Ischemic time 194 mins - CDC crossmatch B cell positive - 1 session plasmapheresis/IvIg 11/18/21 - Repeat DSAs 1/24/23 = 0% - OVERDUE for DSA.  Needs this annually.  Patient does not want to travel outside of [place] to get this done.  Encouraged annual draw.  Logistics work on getting to have this drawn at the [place] lab.   # Immunosuppression - Tacro 1mg BID. Tacrolimus goal to 6-8.  Level from 1/27/15 was 6.8.  - Continue Imuran 50mg daily - Prednisone discontinued and avoiding Cellcept due to pancolitis  # Rejection Surveillance - Now starting year 4 post txp, noninvasive surveillance typically can be spread out to q6 months.   However, with her limited compliance and follow up with q3 months labs + office visit.  - Echo + Allomap/Allosure (home draw) q 6 months. - unable to enroll in [facility] cardiac rehab due to insurance issues.  She is interested in exploring this again.   Will submit referral.    # CAV screening -Patient defers stress cardiac MRI at [facility].  Dobutamine stress echo at  done on 11/26/24 with no ischemia noted.  # Prophylaxis - CMV +/+ intermediate risk: was on Valcyte 450mg BID x 6 months (completed on 5/18/22) - Toxo -/- low risk.  - PJP prophylaxis: Bactrim switched to Mepron due to leukopenia. Completed 1 year (11/18/22) - Strongyloides positive, s/p Ivermectin - CAV: on crestor 20mg daily and ASA.  -Lipid panel 11/27/2023: , HDL 57, LDL 56, . -Lipid panel 2/29/2024: , HDL 54, LDL 79, . -Lipid panel 11/5/2024: , HDL 59, LDL 74, .  # Lower extremity swelling L >R, lymphedema -She is having difficulties taking her Lasix every day.  We have tried maintenance therapy.  She is currently taking her Lasix 10 mg as needed. more on a weekly basis.  - Saw vascular team Dr. Comer.  Imaging performed in June 2024.  No DVT however, has deep venous insufficiency of left leg, and GSV insufficiency of right leg consistent with lymphedema.  Was fitted for UNNA boot.  Has not yet started using this.  #Intermittent palpitations -7-day Holter monitor completed in May 2024.  No arrhythmias.  # Diarrhea with hx of colitis - H/o hematochezia on 12/31/22. Initial flex sigmoidoscopy biopsy was negative for CMV. Repeated sigmoidoscopy/biopsy was positive for CMV. However, finalized official report say no evidence of CMV. - Completed trial of rifaximin for possible SIBO - Readmitted with diarrhea, GI infectious work up negative. CT continues to show ?colitis. - Did not tolerated Imodium while inpatient - worsening bloating - Last colonoscopy 3/8/2024: Chronic inflammation on gastric biopsy, nonerosive gastritis, small hiatal hernia.  There was a 2 mm and 6 mm rectosigmoid polyp with internal/external hemorrhoids.  Sigmoid colon biopsy shows active colitis with ulceration with no definitive polyp identified. - Follows with [facility] GI group - Dr. Rios Ya.   - diarrhea was controlled by now back since mid-October 2024. -I reached out to GI team with regards to her new symptoms of diarrhea.  She had EGD/colo in early December.  Will obtain records.  CMV 1/27/25 - negative.  - Patient was unable to afford a repeat trial of Rifaximin.  # Chronic pancreatic duct dilation -MRI/MRCP 9/9/2024 with mild pancreatic duct dilation up to 4 mm in diameter grossly unchanged since 2022.  Small cysts in the pancreas measuring up to 1 cm. - follow with GI.  Will get records as above.   # Right sided lacunar infarct with left leg numbness - Postop c/b left sided facial droop, slurred speech and left sided weakness. Symptoms improved. - Unclear if artifact on head CT.  Follow up brain MRI in 5/9/23: Cerebral volume loss with small vessel ischemic changes no acute infarct. -Has followed up with neurology.  Being referred to Dr. Paris.  Has visit on 2/12/15. Repeat brain MRI shows moderate vascular changes and stable aneurysm.  EEG performed.   # Depression/Anxiety - previously on Effexor however, I no longer see this on her med list. -Needs to establish care with new PCP to refer her to local psych team.  # Sjogren/Scleroderma - Follows with Dr. Corbett ([facility]).  Needs high-resolution CT for ILD monitoring.  # Hypothyroidism - Continue levothyroxine.  Was managed by her PCP.   Needs endo appointment.  Can offer her a local option here in .  Patient defers for now.  #Health maintenance - OVERDUE for PCP visit and ALL health maintenance screening.   - Patient does not want to have additional testing at this time.  We have tried multiple times to have her plugged in with a new PCP.  Will send referral again. - Last DEXA 12/5/23: Spine: -0.9, normal, Femoral neck: -2.5, osteoporosis, Total hip: -2.2, osteopenia. - Mammo/PAP: overdue.  Last mammo in 2021. - Derm: overdue for annual skin screen.  Follow-up with me in April. Working on establishing communication with patient's son for appointment reminders.  I am concerned that patient may have some memory impairment.  [EKG obtained to assist in diagnosis and management of assessed problem(s)] : EKG obtained to assist in diagnosis and management of assessed problem(s)

## 2025-02-12 NOTE — HISTORY OF PRESENT ILLNESS
[FreeTextEntry1] : Patient is a 69 year old right handed female with extensive PMHx of heart transplant (2021), HTN, HTN, scleroderma, sjogrens who presents for initial evaluation for BL cavernous carotid artery aneurysms and prior history of stroke.   Patient endorses left sided leg numbness that began 6 months before heart transplant while she was on ECMO (suspected injury from compartment syndrome). She denies left face and arm numbness at time when leg numbness started. She reports decent rehabilitation post ECMO, but after heart transplant.   She states the numbness and leg function has gotten worse since her heart transplant.  She does endorse slight left-hand weakness. She was recently started on gabapentin however did not start because she thought it was a narcotic. PT after heart surgery patient did not have any recovery of the leg. She currently walks with a quad cane.   MRI BRAIN: No acute infarction. Moderate chronic microvascular changes. MRA BRAIN: Bilateral medially oriented cavernous carotid artery outpouchings/aneurysms, unchanged.  Denies dizziness, confusion, memory lapse, HA, CP, SOB, LOC, fever, chills.

## 2025-02-12 NOTE — DISCUSSION/SUMMARY
[FreeTextEntry1] : 70 yo woman with Sjogrens, scleroderma, NICM previously on ECMO s/p heart transplant 2021 with chronic left lower extremity numnbess > weakness (suspected compartment syndrome at the time of ECMO) and incidental bilateral 1-2 mm paraclinoid ICA aneurysms.   I counseled the patient and family members in regards to intracranial aneurysms, anatomy, morphology, pathophysiology, and natural history. Given small size of aneurysm and location, recommend conservative management with continued surveillance at this time.   I counseled the patient to seek emergent medical attention if they experience sentinel headache with or without stroke symptoms or if they experience stroke symptoms with or without headache. Patient agrees and conveys good understanding.   Left lower extremity numbness and weakness - recommend eval by neuromuscular neurology and EMG/NCV for prognosis and treatment.    Plan: 1. Cardiac PT rehab- work on strengthening on LEs 2. MRA head in one year for monitoring of aneurysm 3. Referral for neuromuscular specialist for LLE weakness/numbness and EMG/NCV  4. normotension 5. continue aspirin and statin daily. 6. Follow up cardiology.     Follow up 1 year.

## 2025-02-12 NOTE — PHYSICAL EXAM
[FreeTextEntry1] : GENERAL PHYSICAL EXAM: GEN: no acute distress, normal affect EYES:  sclera white, conjunctiva clear PULM: no respiratory distress, normal rate EXT: no edema, no cyanosis SKIN: warm, dry, no rash or lesion on exposed skin   NEUROLOGICAL EXAM: MENTAL STATUS Orientation: alert and oriented to person, place, time, and situation Language: clear and fluent, intact comprehension and repetition   CRANIAL NERVES II: visual fields full to confrontation III, IV, VI:  PERRL, EOMI, VFF, no nystagmus, no ptosis V, VII: facial sensation and movement intact and symmetric VIII: hearing intact to finger rub bilat XI: BL shoulder shrug intact, lateral head movement 5/5 bilat XII: tongue midline   MOTOR:  RUE 5/5 RLE 5/5 LUE 5/5 LLE 5-/5 LLE hip flexion, 5/5 knee ext/dorsiflexion Tone and bulk are normal in upper and lower limbs.    SENSATION: Diminished sensation to LLE from thigh to foot. Intact to light touch and temp in all 4 EXTs,   COORDINATION: Tremulous L hand FNF, WNL right side FNF   REFLEX: 3+ in BL biceps, brachioradialis, 1+ BL patella   GAIT Slowed shortened steps, left hemiparetic gait unsteady without quadcane.  L leg has 2+ pitting edema

## 2025-04-23 NOTE — PHYSICAL EXAM
[Normal] : soft, non-tender, no masses/organomegaly, normal bowel sounds [No Rash] : no rash [Moves all extremities] : moves all extremities [Alert and Oriented] : alert and oriented [de-identified] : JVP at clavicle [de-identified] : Well-healed sternotomy scar [de-identified] : ambulates with cane [de-identified] : trace nonpitting edema around left ankle and foot dorsum [de-identified] : some skin tightening and shine consistent with her scleroderma diagnosis.

## 2025-04-23 NOTE — HISTORY OF PRESENT ILLNESS
[FreeTextEntry1] : 70 yo F (originally from [place]) with stage D NICM s/p HVAD as BTT on 4/27/21 (was listed status 4) now s/p OHT 11/18/21, cellept induced diarrhea/colitis, Sjogren'`s/Scleroderma since age 42 (Raynaud syndrome, GERD, telangiectasia, sclerodactyly, +Ro/centromere and BARB), ex-smoker, papillary thyroid carcinoma (T1a N0 Mx 0.8cm) s/p total thyroidectomy '19 and proteinuria who comes for routine follow up.   She was admitted for planned heart transplant after a suitable donor was identified. Patient underwent OHT on 11/18/21, ischemic time ~3hours. Intraop she was given 3uPRBC, 1uPLT, 1uCryo. Postop course was complicated due to persistent elevated lactate. Her cardiac index was borderline and donor CDC crossmatch report subsequently came back positive for donor B cell crossmatch. Given her borderline HD and positive crossmatch she was given 1 session of plasmapheresis and IVIG on 11/18. Her hemodynamics improved but developed slurred speech, left facial droop, and LUE weakness 11/22 prompting head CT which showed small acute lacunar infarct and EEG was negative. She also had persistent sinus bradycardia and was on terbutaline for a period of time and developed LVOT obstruction from hyperdynamic function which eventually  resolved. She had persistent altered mental status and there was a concern for PRES prompting transition from tacrolimus to cyclosporine and due to worsening mental status required intubation on 11/26. She had a persistent leukocytosis and CT abdomen raised concern for pneumatosis with air within SMV prompting laparotomy which revealed patchy hemorrhagic areas in the sigmoid/transverse colon without necrosis. She underwent sigmoidoscopy on 1/3/22 which showed colitis. 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 Frequency of BMs also improved following introduction of Rifaximin on 1/16 for possible SIBO.   She had a prolonged hospitalization from 11/18/21 through 1/19/22. Her mental status improved and prograf was reintroduced. Her most active issue was profound deconditioning for which she was transferred to [place] rehab. She was discharged from rehab on 2/3/22.   She was admitted to [facility] from 5/18/22 to 5/20/22 after she c/o nausea, vomiting and intractable diarrhea. Her labs were remarkable for leukopenia. She had extensive infectious work up including abdomen CT, Stool GI/PCR panel, CMV PCR, blood cultures and urine culture. She was found to have a UTI for which she was discharged on cefpodoxime x 5 more days. Her abd CT showed similar findings as prior studies raising concern for colitis. She was also found to have pancreatic duct dilatation. GI recommended MRI/MRCP. Her symptoms improved and she was discharged with recommendations to follow up with [facility] and GI. She had completed valcyte for CMV prophylaxis. Bactrim was switched to Mepron due to leukopenia.   10/2022 she was admitted for active shingles on her left back/underarm and received IV antiviral therapy w/ improvement.   At patient's first annual LHC/RHC demonstrated mild diffuse atherosclerotic CAD and EMB ISHLT Grade 1R/2 and Imuran was started. Since then, she developed elevated liver enzymes including alk phos.  There was some pancreatic duct dilation that was identified but never got the recommended MRI follow-up.  Post transplant she struggled with intermittent medication compliance as well as consultant follow-up.  Some of this has been in the setting of likely depression.  She has reported feeling lost and having minimal social interactions staying home most of the day.  She was seen by Dr. Kong on 2/22/23 and started on Effexor with some benefit in her mood and energy.  However, she still struggles with going to all of her  appointments despite aid with transportation.  2nd annual LHC/RHC with persistent mild diffuse CAD (11/13/23) with EMB 0R, C4d neg (1/24/23).  Echocardiogram and Allomap/sure have been stable. She has been stable with q3 monthly echo and heartcare checks.    On 5/14/24 she had noted new intermittent palpitations.  Zio patch was placed.  No change in medications were made.  She was seen by neurology with MRI revealing a stable brain aneurysm and negative EEG (rare bitemporal intermittent slowing - nonspecific - no seizures).  I last saw her on 8/9/2024 with stable echocardiogram.  At that visit, she noted she had a recent GI workup.  She notes that she was started on an antibiotic, likely rifaximin, that was quite costly for her.  She is not taking this.  I also had advised her to take Lasix daily however she continues to do this as needed.  She was also seen again by neurology and started on gabapentin however she did not take as she thought this was a narcotic.  In Nov 2024, patient reported ongoing diarrhea x 3 weeks which she did not tell the transplant team about.  [place] had [place] [facility] [place]/colo on 12/5/24 with mild left-sided colitis and 3 polyps.  CMV level was unremarkable.  Final biopsy results not available to me. I last saw her on 1/29/25.  At that visit, she was open to starting cardiac rehab.    Today, she feels well.  She has has been going to cardiac rehab.  She feels that she has gained about 8 pounds of fluid.  She has stopped taking her diuretic.  She continues to feel disappointed with her GI workup and is looking for a new GI doctor.  She specifically denies angina, SOB at rest, dizziness/LH, orthopnea and PND.  Since I saw her last, she also set up a new appointment with a new primary but she is uncertain of the name.  Will try to reach out - Dr. Armenta? [phone].  At baseline, she ambulates with a cane.

## 2025-04-23 NOTE — DISCUSSION/SUMMARY
[Patient] : the patient [Risks] : risks [EKG obtained to assist in diagnosis and management of assessed problem(s)] : EKG obtained to assist in diagnosis and management of assessed problem(s) [FreeTextEntry1] : # OHT 11/18 - q3 month office visits and labs.    - q6 months Echo + Allomap/Allosure (home draw).  Next due in July 2025.  - currently undergoing cardiac rehab.  # CAV screening -Patient defers stress cardiac MRI at [facility].  Dobutamine stress echo at  done on 11/26/24 with no ischemia noted.  # Prophylaxis - CMV +/+ intermediate risk: was on Valcyte 450mg BID x 6 months (completed on 5/18/22) - Toxo -/- low risk.  - PJP prophylaxis: Bactrim switched to Mepron due to leukopenia. Completed 1 year (11/18/22) - Strongyloides positive, s/p Ivermectin - CAV: on crestor 20mg daily and ASA.  -Lipid panel 11/27/2023: , HDL 57, LDL 56, . -Lipid panel 2/29/2024: , HDL 54, LDL 79, . -Lipid panel 11/5/2024: , HDL 59, LDL 74, . - repeat lipid panel ordered.  # Lower extremity swelling L >R, lymphedema -patient stopped lasix but has swelling today.  I have asked her to go back to taking Lasix 10 mg daily. - Saw vascular team Dr. Comer.  Imaging performed in June 2024.  No DVT however, has deep venous insufficiency of left leg, and GSV insufficiency of right leg consistent with lymphedema.  Was fitted for UNNA boot.  Has not yet started using this.  #Intermittent palpitations -7-day Holter monitor completed in May 2024.  No arrhythmias.  # Diarrhea with hx of colitis - H/o hematochezia on 12/31/22. Initial flex sigmoidoscopy biopsy was negative for CMV. Repeated sigmoidoscopy/biopsy was positive for CMV. However, finalized official report say no evidence of CMV. - Completed trial of rifaximin for possible SIBO - Readmitted with diarrhea, GI infectious work up negative. CT continues to show ?colitis. - Did not tolerated Imodium while inpatient - worsening bloating - Last colonoscopy 3/8/2024: Chronic inflammation on gastric biopsy, nonerosive gastritis, small hiatal hernia.  There was a 2 mm and 6 mm rectosigmoid polyp with internal/external hemorrhoids.  Sigmoid colon biopsy shows active colitis with ulceration with no definitive polyp identified. - [place] [facility] [place] Dr. Rios Ya.   - Last colonoscopy 12/5/24 with mild left sided colitis.  CMV 1/27/25 - negative.  - Patient was unable to afford a repeat trial of Rifaximin. - She is looking for new GI team.  # Chronic pancreatic duct dilation -MRI/MRCP 9/9/2024 with mild pancreatic duct dilation up to 4 mm in diameter grossly unchanged since 2022.  Small cysts in the pancreas measuring up to 1 cm.  # Right sided lacunar infarct with left leg numbness - Postop c/b left sided facial droop, slurred speech and left sided weakness. Symptoms improved. - Unclear if artifact on head CT.  Follow up brain MRI in 5/9/23: Cerebral volume loss with small vessel ischemic changes no acute infarct. -Has followed up with neurology.  Being referred to Dr. Paris.  Has visit on 2/12/15. Repeat brain MRI shows moderate vascular changes and stable aneurysm.  EEG performed.   # Depression/Anxiety - previously on Effexor however, I no longer see this on her med list. -Needs to establish care with new PCP to refer her to local psych team.  # Sjogren/Scleroderma - Follows with Dr. Corbett ([facility]).  Needs high-resolution CT for ILD monitoring.  # Hypothyroidism - Continue levothyroxine.  Was managed by her PCP.   Needs endo appointment.  Can offer her a local option here in .  Patient defers for now.  #Health maintenance - OVERDUE for PCP visit and ALL health maintenance screening.   - Will try to reach out - Dr. Armenta? [phone] patient's new PCP. - Last DEXA 12/5/23: Spine: -0.9, normal, Femoral neck: -2.5, osteoporosis, Total hip: -2.2, osteopenia. - Mammo/PAP: overdue.  Last mammo in 2021. - Derm: overdue for annual skin screen.  Follow-up with me in July.

## 2025-04-23 NOTE — CARDIOLOGY SUMMARY
[de-identified] : 4/23/2025: NSR, HR 85, low voltage precordial leads, nonspecific IVCD with some RBBB features.  1/29/15: NSr, HR 82, low voltage precordial leads, nonspecific IVCD with some RBBB features.  8/5/24: NSR, nonspecific IVCD with some RBBB features. 5/1/2024: NSR, nonspecific IVCD with some RBBB features. 2/7/2024: NSR, HR 89, RSR' V1 1/24/23: SR HR 95bpm with RBBB 5/31/22: ST , incomplete RBBB, LAE, QR V1/V2 [de-identified] : Holter monitor 5/1 - 5/9/2024: NSR, HR 62-1 25, average 88, RBBB pattern, 1 run of SVT max 4 beats.  PVCs and PACs less than 1% burden. [de-identified] : Dobumtamine stress echo 11/26/2024: Negative for ischemia infarction.  Hyperdynamic LV function with increased LVOT gradients with dobutamine and chordal RADHA noted. [de-identified] : 1/29/25: LVEF > 75%, LVEDD 3.0cm, IVSd 1.1cm, GLS unable to do, normal RV, TAPSE 1.9cm, trace MR, mod TR, PAPS 28mmHg.  8/5/2024: LVEF 75%, LVEDD 3.4 cm, IVSd 1.1 cm, GLS -16.4%, RV mildly enlarged, TAPSE decreased 1.3 cm, mod TR, PASP 30mmHg. 4/22/2024: LVEF 65 to 70%, GLS -13.1%, LVEDD 3.2 cm, IVSd 1.1 cm, normal diastolic function, normal RV, mild MR, PASP 29 mmHg. 12/20/23: LVEF 66%, GLS -14.2% (improved from prior on 10/20/2023 of -13.5%), LVEDD 3.2 cm, IVSd 1.0 cm, G3DD, mild MR, mod TR. 12/9/22: normal biV function. No valvular abnormalities. No pericardial effusion. 8/30/22: Normal bi-v function. Mild-mod TR. No evidence of pericardial effusion. No significant changes compared to prior TTE 7/8/22. 7/8/22: TTE: normal bi-v function 6/1/22 TTE: normal bi-v function 3/29/22 limited TTE. Normal RV function. No PE.  2/15/22 normal graft function, mild-mod TR. No PE.  1/18/22 normal graft function. Mod TR. No PE.   [de-identified] : 5/18/22 CT abdomen: mild left colonic wall thickening, similar to prior studies, raising concern for colitis. Diffuse main pancreatic ductal dilatation to 6 mm, increased from 12/5/2021.  [de-identified] : DEXA 12/5/23: Spine: -0.9, normal, Femoral neck: -2.5, osteoporosis, Total hip: -2.2 , osteopenia. [de-identified] : 11/13/23 LHC: coronary circulation is right dominant, mild diffuse disease LAD, LM, and RCA.  No CAD on IVUS. 1/24/23 RHC/ EMBx: 0R, C4d neg. 12/9/22: L/RHC: mild diffuse CAD, RA 2 PAP 17/9/12 PCW 5 Pasat 66% Annelise CO/CI 4.5/2.8. EMBx ISHLT Grade 1R/2 3/29/22 RHC RA 11 RV 25/4  PA 26/12/17 PCWP 9 /99 Annelise CO/CI 4.7/3.07  ACR 0R/pAMR 0 2/15/22 RA 8 RV 31/9 PA 31/8/21 PCWP 9 PA sat 69.4%Annelise CO/CI 4.8/3.17 ACR 0R pAMR 0 1/18/22: RA 2, RV 25/0, PCWP 5, PA 23/9/15,Ao Sat 99.0, PA sat 67.3, CI 3.4, ACR 1R/1A pAMR 0 1/4/22: RA 2, PA 26/10/16, SVR 1122. CI 3.56. ACR 1R/1A, pAMR 0 12/28/21: RA 5, RV 27/6, PA 27/11/18, PCWP 9  V Wave 12, PA sat 64.5 /5/85, CO/CI 3.8/3.0 ACR 1R/2 pAMR 0 12/14/21: RA 10/13/8, RV 31/1/11, PCWP 11, PA sat 76.9 ACR 0R pAMR 0 12/7/21: RA 9, RV 31/9, PA 34/7 20, PCWP 13 (v 19), Pa 66, Annelise 5.2/2.8 ACR 0R pAMR 0 11/30/21: RA 9, PA 33/16 (24), PCWP 16, Annelise CO/CI 6.0/3.7, TD CO/CI 3.5/2.2. ACR 0R/pAMR 0 11/24/21: RA 6, RV 29/9, PA 25/8/15, PCWP 9, CO/CI 4.5/2.8 (on mil 0.125); ISHLT grade 0; AMR 0 [de-identified] : Allomap/allosure 2/7/25: Allomap 31, Allosure 0.09 8/7/24: 33/0.08 4/23/24 32/0.08 3/23/24 32/ 0.09 1/30/24 34/ <0.08 2/15/22 18/0.16 3/9/22 19/0.20 3/29/22 <0.12 4/6/22 28 4/19 12/<0.12 5/26/22 33/<0.12 6/23 35/<012 7/29 36/<0.12 8/31 34/<0.12 9/30 36/<0.12 11/04/22 34/<0.12 12/16/22 31/<0.12  DSA 11/24/21 No DSAs 11/30/21 No DSAs 12/7/21 No DSAs 12/14/21 No DSAs 12/28/21 No DSAs 1/04/22 No DSAs 1/18/22 No DSAs 2/15/22 No DSAs 3/29/22 No DSAs 10/17/22 DR1 1644  1/24/23 Class I/II 0/0